# Patient Record
Sex: FEMALE | Race: WHITE
[De-identification: names, ages, dates, MRNs, and addresses within clinical notes are randomized per-mention and may not be internally consistent; named-entity substitution may affect disease eponyms.]

---

## 2017-08-31 ENCOUNTER — RX RENEWAL (OUTPATIENT)
Age: 32
End: 2017-08-31

## 2017-09-21 ENCOUNTER — RX RENEWAL (OUTPATIENT)
Age: 32
End: 2017-09-21

## 2017-09-26 ENCOUNTER — RX RENEWAL (OUTPATIENT)
Age: 32
End: 2017-09-26

## 2017-10-26 ENCOUNTER — RX RENEWAL (OUTPATIENT)
Age: 32
End: 2017-10-26

## 2017-11-03 ENCOUNTER — APPOINTMENT (OUTPATIENT)
Dept: OBGYN | Facility: CLINIC | Age: 32
End: 2017-11-03
Payer: COMMERCIAL

## 2017-11-03 VITALS
BODY MASS INDEX: 39.37 KG/M2 | DIASTOLIC BLOOD PRESSURE: 85 MMHG | HEIGHT: 66 IN | WEIGHT: 245 LBS | SYSTOLIC BLOOD PRESSURE: 141 MMHG

## 2017-11-03 DIAGNOSIS — Z01.419 ENCOUNTER FOR GYNECOLOGICAL EXAMINATION (GENERAL) (ROUTINE) W/OUT ABNORMAL FINDINGS: ICD-10-CM

## 2017-11-03 LAB
HCG UR QL: NEGATIVE
QUALITY CONTROL: YES

## 2017-11-03 PROCEDURE — 81025 URINE PREGNANCY TEST: CPT

## 2017-11-03 PROCEDURE — 99395 PREV VISIT EST AGE 18-39: CPT

## 2017-11-06 LAB
C TRACH RRNA SPEC QL NAA+PROBE: NOT DETECTED
HPV HIGH+LOW RISK DNA PNL CVX: NOT DETECTED
N GONORRHOEA RRNA SPEC QL NAA+PROBE: NOT DETECTED
SOURCE TP AMPLIFICATION: NORMAL

## 2017-11-08 LAB — CYTOLOGY CVX/VAG DOC THIN PREP: NORMAL

## 2018-12-10 ENCOUNTER — APPOINTMENT (OUTPATIENT)
Dept: OBGYN | Facility: CLINIC | Age: 33
End: 2018-12-10
Payer: COMMERCIAL

## 2018-12-10 VITALS
HEIGHT: 66 IN | DIASTOLIC BLOOD PRESSURE: 70 MMHG | BODY MASS INDEX: 35.2 KG/M2 | WEIGHT: 219 LBS | SYSTOLIC BLOOD PRESSURE: 120 MMHG

## 2018-12-10 DIAGNOSIS — Z87.898 PERSONAL HISTORY OF OTHER SPECIFIED CONDITIONS: ICD-10-CM

## 2018-12-10 LAB
HCG UR QL: NEGATIVE
QUALITY CONTROL: YES

## 2018-12-10 PROCEDURE — 99395 PREV VISIT EST AGE 18-39: CPT

## 2018-12-10 PROCEDURE — 81025 URINE PREGNANCY TEST: CPT

## 2018-12-13 LAB
C TRACH RRNA SPEC QL NAA+PROBE: NOT DETECTED
N GONORRHOEA RRNA SPEC QL NAA+PROBE: NOT DETECTED
SOURCE AMPLIFICATION: NORMAL

## 2019-05-20 ENCOUNTER — RX RENEWAL (OUTPATIENT)
Age: 34
End: 2019-05-20

## 2019-06-14 ENCOUNTER — APPOINTMENT (OUTPATIENT)
Dept: OBGYN | Facility: CLINIC | Age: 34
End: 2019-06-14
Payer: COMMERCIAL

## 2019-06-14 VITALS
HEIGHT: 66 IN | SYSTOLIC BLOOD PRESSURE: 126 MMHG | BODY MASS INDEX: 36.16 KG/M2 | DIASTOLIC BLOOD PRESSURE: 76 MMHG | WEIGHT: 225 LBS

## 2019-06-14 LAB
HCG UR QL: NEGATIVE
QUALITY CONTROL: YES

## 2019-06-14 PROCEDURE — 99213 OFFICE O/P EST LOW 20 MIN: CPT

## 2019-06-14 PROCEDURE — 81025 URINE PREGNANCY TEST: CPT

## 2019-06-14 NOTE — COUNSELING
[Nutrition] : nutrition [Breast Self Exam] : breast self exam [Exercise] : exercise [STD (testing, results, tx)] : STD (testing, results, tx) [Vitamins/Supplements] : vitamins/supplements [Contraception] : contraception [Weight Management] : weight management [FreeTextEntry2] : ACHES reviewed.  EAting better and is exercising, though has not lost weight, actually is 6 pounds up in 6 months

## 2019-11-26 ENCOUNTER — RX RENEWAL (OUTPATIENT)
Age: 34
End: 2019-11-26

## 2020-02-26 ENCOUNTER — RX RENEWAL (OUTPATIENT)
Age: 35
End: 2020-02-26

## 2020-03-21 ENCOUNTER — RX RENEWAL (OUTPATIENT)
Age: 35
End: 2020-03-21

## 2020-04-10 ENCOUNTER — APPOINTMENT (OUTPATIENT)
Dept: OBGYN | Facility: CLINIC | Age: 35
End: 2020-04-10

## 2020-06-25 ENCOUNTER — APPOINTMENT (OUTPATIENT)
Dept: OBGYN | Facility: CLINIC | Age: 35
End: 2020-06-25
Payer: COMMERCIAL

## 2020-06-25 VITALS
HEIGHT: 66 IN | WEIGHT: 216.38 LBS | SYSTOLIC BLOOD PRESSURE: 130 MMHG | DIASTOLIC BLOOD PRESSURE: 86 MMHG | BODY MASS INDEX: 34.78 KG/M2

## 2020-06-25 LAB
HCG UR QL: NEGATIVE
QUALITY CONTROL: YES

## 2020-06-25 PROCEDURE — 81025 URINE PREGNANCY TEST: CPT

## 2020-06-25 PROCEDURE — 99395 PREV VISIT EST AGE 18-39: CPT

## 2020-06-25 NOTE — PHYSICAL EXAM
[Alert] : alert [Awake] : awake [Soft] : soft [Oriented x3] : oriented to person, place, and time [Normal] : uterus [Nulliparous] : was nulliparous [No Bleeding] : there was no active vaginal bleeding [RRR, No Murmurs] : RRR, no murmurs [Normal Position] : in a normal position [Uterine Adnexae] : were not tender and not enlarged [CTAB] : CTAB [Acute Distress] : no acute distress [LAD] : no lymphadenopathy [Goiter] : no goiter [Thyroid Nodule] : no thyroid nodule [Mass] : no breast mass [Nipple Discharge] : no nipple discharge [Axillary LAD] : no axillary lymphadenopathy [Tender] : non tender [FreeTextEntry5] : supple though tight os

## 2020-06-25 NOTE — COUNSELING
[Breast Self Exam] : breast self exam [Exercise] : exercise [Nutrition] : nutrition [Contraception] : contraception [Vitamins/Supplements] : vitamins/supplements [Weight Management] : weight management [FreeTextEntry2] : encouraged to keep on healthy path with diet and exercise.  pt to start exercising now that knee is better.  spein 15 minutes discussing  importance of lweght loss, getting active, effect of weight on BPand menses, stress on joints etc.  Better to manage younger than as she gets o lder.  We also discussed Kyleena , benefits and pt will read lit

## 2020-06-25 NOTE — HISTORY OF PRESENT ILLNESS
[___ Year(s) Ago] : [unfilled] year(s) ago [Good] : being in good health [Healthy Diet] : a healthy diet [Weight Concerns] : weight concens [Regular Exercise] : regular exercise [___ Servings of Dairy/Day] : [unfilled] servings of dairy foods per day [3 or more times/wk] :  3 or more times per week [Biking] : biking [Recent Weight Loss (___ Lbs)] : recent [unfilled] ~Ulbs weight loss [Current] : metabolic screening reviewed and current [Last Pap ___] : Last cervical pap smear was [unfilled] [Performed Within 5 Years] : lipid profile performed within the past five years [Hypertension] : hypertension [Obesity] : obesity [Reproductive Age] : is of reproductive age [Definite ___ (Date)] : the last menstrual period was [unfilled] [Regular Cycle Intervals] : have been regular [Normal Amount/Duration] : it was of a normal amount and duration [Bleeding Usually Lasts ___ Days] : usually last [unfilled] days [Oral Contraceptive] : uses oral contraception pills [Contraception] : uses contraception [Condoms] : uses condoms [Pregnancy History] : pregnancy history: [AB Induced ___] : elective abortions: [unfilled] [Total Preg ___] : [unfilled] [Sexually Active] : is sexually active [Monogamous (Male Partner)] : is monogamous with a male partner [Safe Sex] : uses safe sex precautions [HPV Vaccine ___] : HPV vaccine [unfilled] [Last Screen ___] : last STD screen was [unfilled] [Walking] : walking [Performed Last Year] : thyroid function test performed last year [Lighter Bleeding] : bleeding has been lighter than normal [Diabetes] : no diabetes [High LDL] : no high LDL cholesterol [Stress] : no stress [Low HDL] : no low HDL cholesterol [Tobacco Use] : no tobacco use [Illicit Drug Use] : no illicit drug use [Sedentary Lifestyle] : no sedentary lifestyle [Previous Breast Cancer] : no previous breast cancer [Abnormal Cervical Cytology] : no abnormal cervical cytology [FH Cardiovascular Disease] : no family history of cardiovascular disease [Inflammatory Bowel Disease] : no inflammatory bowel disease [HPV Positive] : no positive screening for human papilloma virus [Menstrual Problems] : reports normal menses [Chlamydia] : Chlamydia - [Gonorrhea] : Gonorrhea -

## 2021-03-22 ENCOUNTER — TRANSCRIPTION ENCOUNTER (OUTPATIENT)
Age: 36
End: 2021-03-22

## 2021-03-25 ENCOUNTER — TRANSCRIPTION ENCOUNTER (OUTPATIENT)
Age: 36
End: 2021-03-25

## 2021-03-31 ENCOUNTER — TRANSCRIPTION ENCOUNTER (OUTPATIENT)
Age: 36
End: 2021-03-31

## 2021-08-22 ENCOUNTER — TRANSCRIPTION ENCOUNTER (OUTPATIENT)
Age: 36
End: 2021-08-22

## 2021-08-26 ENCOUNTER — RX RENEWAL (OUTPATIENT)
Age: 36
End: 2021-08-26

## 2021-08-29 ENCOUNTER — NON-APPOINTMENT (OUTPATIENT)
Age: 36
End: 2021-08-29

## 2021-09-25 ENCOUNTER — TRANSCRIPTION ENCOUNTER (OUTPATIENT)
Age: 36
End: 2021-09-25

## 2021-11-22 ENCOUNTER — RX RENEWAL (OUTPATIENT)
Age: 36
End: 2021-11-22

## 2021-12-10 ENCOUNTER — APPOINTMENT (OUTPATIENT)
Dept: OBGYN | Facility: CLINIC | Age: 36
End: 2021-12-10
Payer: COMMERCIAL

## 2021-12-10 ENCOUNTER — RESULT CHARGE (OUTPATIENT)
Age: 36
End: 2021-12-10

## 2021-12-10 VITALS
WEIGHT: 211 LBS | SYSTOLIC BLOOD PRESSURE: 108 MMHG | HEIGHT: 66 IN | DIASTOLIC BLOOD PRESSURE: 80 MMHG | BODY MASS INDEX: 33.91 KG/M2

## 2021-12-10 DIAGNOSIS — Z30.40 ENCOUNTER FOR SURVEILLANCE OF CONTRACEPTIVES, UNSPECIFIED: ICD-10-CM

## 2021-12-10 DIAGNOSIS — Z30.41 ENCOUNTER FOR SURVEILLANCE OF CONTRACEPTIVE PILLS: ICD-10-CM

## 2021-12-10 LAB
HCG UR QL: NEGATIVE
QUALITY CONTROL: YES

## 2021-12-10 PROCEDURE — 99395 PREV VISIT EST AGE 18-39: CPT

## 2021-12-10 PROCEDURE — 36415 COLL VENOUS BLD VENIPUNCTURE: CPT

## 2021-12-10 PROCEDURE — 81025 URINE PREGNANCY TEST: CPT

## 2021-12-10 NOTE — PLAN
[FreeTextEntry1] : 35 YO FEMALE,HERE FOR ANNUAL EXAM\par PHYSICAL EXAM\par NORMAL\par BREAST EXAM NORMAL\par - PAP\par - STD \par -URINE PREG TEST NEG\par Contraceptive education given. Reviewed side effect, risk and benefits. Safe sex and lifestyle counseling. advice . advice continue as directed, reviewed and discussed adherence and compliance. Rsks and benefits of OC's including stroke, HTN, MI, phlebitis, breast cancer and pregnancy prevention all d/w patient. \par WEIGHT MANAGEMENT Discussed WITH PATIENT\par ALL QUESTIONS ANSWERED\par DISCUSSED PRECAUTIONS AGAINST COVID19, INCLUDING MASK WEARING, SOCIAL DISTANCING AND HAND WASHING.\par VACCINATED X 2. (PFIZER) \par THE PHYSICAL EXAM PORTION OF THE VISIT WAS CHAPERONED BY VENESSA LOPEZ.

## 2021-12-10 NOTE — COUNSELING
[Nutrition/ Exercise/ Weight Management] : nutrition, exercise, weight management [Sunscreen] : sunscreen [Breast Self Exam] : breast self exam [Bladder Hygiene] : bladder hygiene [STD (testing, results, tx)] : STD (testing, results, tx) [Vaccines] : vaccines

## 2021-12-10 NOTE — HISTORY OF PRESENT ILLNESS
[Regular Cycle Intervals] : periods have been regular [Menarche Age: ____] : age at menarche was [unfilled] [On BCP at conception] : the patient was on BCP at conception [Currently Active] : currently active [Men] : men [No] : No [Yes] : Yes [Condoms] : Condoms [Patient would like to be screened for STIs] : Patient would like to be screened for STIs [PapSmeardate] : 2017 [FreeTextEntry1] : 11/24/21

## 2021-12-10 NOTE — PHYSICAL EXAM
[Chaperone Present] : A chaperone was present in the examining room during all aspects of the physical examination [FreeTextEntry1] : VENESSA LOPEZ.  [Appropriately responsive] : appropriately responsive [Alert] : alert [No Acute Distress] : no acute distress [No Lymphadenopathy] : no lymphadenopathy [Regular Rate Rhythm] : regular rate rhythm [No Murmurs] : no murmurs [Clear to Auscultation B/L] : clear to auscultation bilaterally [Soft] : soft [Non-tender] : non-tender [Non-distended] : non-distended [No HSM] : No HSM [No Lesions] : no lesions [No Mass] : no mass [Oriented x3] : oriented x3 [Examination Of The Breasts] : a normal appearance [No Masses] : no breast masses were palpable [Labia Majora] : normal [Labia Minora] : normal [Normal] : normal [Uterine Adnexae] : normal

## 2021-12-13 LAB
C TRACH RRNA SPEC QL NAA+PROBE: NOT DETECTED
HBV SURFACE AB SER QL: NONREACTIVE
HBV SURFACE AG SER QL: NONREACTIVE
HCV AB SER QL: NONREACTIVE
HCV S/CO RATIO: 0.07 S/CO
HIV1+2 AB SPEC QL IA.RAPID: NONREACTIVE
HPV HIGH+LOW RISK DNA PNL CVX: NOT DETECTED
N GONORRHOEA RRNA SPEC QL NAA+PROBE: NOT DETECTED
SOURCE TP AMPLIFICATION: NORMAL
T PALLIDUM AB SER QL IA: NEGATIVE

## 2021-12-16 LAB — CYTOLOGY CVX/VAG DOC THIN PREP: NORMAL

## 2022-07-26 ENCOUNTER — NON-APPOINTMENT (OUTPATIENT)
Age: 37
End: 2022-07-26

## 2023-02-06 ENCOUNTER — APPOINTMENT (OUTPATIENT)
Dept: OBGYN | Facility: CLINIC | Age: 38
End: 2023-02-06
Payer: COMMERCIAL

## 2023-02-06 ENCOUNTER — NON-APPOINTMENT (OUTPATIENT)
Age: 38
End: 2023-02-06

## 2023-02-06 VITALS
BODY MASS INDEX: 34.55 KG/M2 | HEIGHT: 66 IN | DIASTOLIC BLOOD PRESSURE: 70 MMHG | SYSTOLIC BLOOD PRESSURE: 122 MMHG | WEIGHT: 215 LBS

## 2023-02-06 LAB
HCG UR QL: NEGATIVE
QUALITY CONTROL: YES

## 2023-02-06 PROCEDURE — 81025 URINE PREGNANCY TEST: CPT

## 2023-02-06 PROCEDURE — 99395 PREV VISIT EST AGE 18-39: CPT

## 2023-02-06 PROCEDURE — 36415 COLL VENOUS BLD VENIPUNCTURE: CPT

## 2023-02-06 RX ORDER — MULTIVITAMIN
TABLET ORAL
Refills: 0 | Status: ACTIVE | COMMUNITY

## 2023-02-06 NOTE — PLAN
[FreeTextEntry1] : -F/u pap and STI screening \par -Rx for HERNANDO renewed, reviewed signs and symptoms of potentially serious adverse events including CHP, SOB, leg pain/swelling/redness, HA's and sensory changes and the need for emergent evaluation in case any of these should occur \par -Encouraged dermatology fro routine skin survey \par -RTO one year or sooner PRN for any new problems, questions or concerns

## 2023-02-06 NOTE — PHYSICAL EXAM
[Chaperone Present] : A chaperone was present in the examining room during all aspects of the physical examination [FreeTextEntry1] : JOHN Erickson  [Appropriately responsive] : appropriately responsive [Alert] : alert [No Acute Distress] : no acute distress [Soft] : soft [Non-tender] : non-tender [Non-distended] : non-distended [No Mass] : no mass [Oriented x3] : oriented x3 [Examination Of The Breasts] : a normal appearance [No Masses] : no breast masses were palpable [Labia Majora] : normal [Labia Minora] : normal [No Bleeding] : There was no active vaginal bleeding [Nabothian Cyst ___ cm] : [unfilled] ~Ucm nabothian cyst [Normal] : normal [Tenderness] : nontender [Uterine Adnexae] : non-palpable

## 2023-02-06 NOTE — HISTORY OF PRESENT ILLNESS
[N] : Patient reports normal menses [Oral Contraceptive] : uses oral contraception pills [Y] : Positive pregnancy history [Menarche Age: ____] : age at menarche was [unfilled] [Currently Active] : currently active [PapSmeardate] : 12/10/21 [TextBox_31] : NEG [HIVDate] : 12/10/21 [TextBox_53] : NEG [SyphilisDate] : 12/10/21 [TextBox_58] : NEG [GonorrheaDate] : 12/10/21 [TextBox_63] : NEG [ChlamydiaDate] : 12/10/21 [TextBox_68] : NEG [HPVDate] : 12/10/21 [TextBox_78] : NEG [HepatitisBDate] : 12/10/21 [TextBox_83] : NEG [HepatitisCDate] : 12/10/21 [TextBox_88] : NEG [LMPDate] : 01/27/23 [PGxTotal] : 1 [PGHxAbortions] : 1 [[facility]Living] : 0 [FreeTextEntry1] : 01/27/23

## 2023-02-09 LAB
C TRACH RRNA SPEC QL NAA+PROBE: NOT DETECTED
HAV IGM SER QL: NONREACTIVE
HBV CORE IGM SER QL: NONREACTIVE
HBV SURFACE AG SER QL: NONREACTIVE
HCV AB SER QL: NONREACTIVE
HCV S/CO RATIO: 0.04 S/CO
HIV1+2 AB SPEC QL IA.RAPID: NONREACTIVE
HPV HIGH+LOW RISK DNA PNL CVX: NOT DETECTED
N GONORRHOEA RRNA SPEC QL NAA+PROBE: NOT DETECTED
SOURCE TP AMPLIFICATION: NORMAL
T PALLIDUM AB SER QL IA: NEGATIVE

## 2023-02-10 LAB — CYTOLOGY CVX/VAG DOC THIN PREP: ABNORMAL

## 2024-02-02 ENCOUNTER — APPOINTMENT (OUTPATIENT)
Dept: OBGYN | Facility: CLINIC | Age: 39
End: 2024-02-02
Payer: COMMERCIAL

## 2024-02-02 VITALS
SYSTOLIC BLOOD PRESSURE: 118 MMHG | HEIGHT: 66 IN | BODY MASS INDEX: 33.59 KG/M2 | DIASTOLIC BLOOD PRESSURE: 74 MMHG | WEIGHT: 209 LBS

## 2024-02-02 PROCEDURE — 99213 OFFICE O/P EST LOW 20 MIN: CPT

## 2024-02-04 NOTE — DISCUSSION/SUMMARY
[FreeTextEntry1] : 37 y/o with small clogged follicle on labia minora, no signs of infection - Counseled on findings, no management needed - Return as needed.  Boris Medina MD

## 2024-02-04 NOTE — PHYSICAL EXAM
[Chaperone Present] : A chaperone was present in the examining room during all aspects of the physical examination [Appropriately responsive] : appropriately responsive [Alert] : alert [No Acute Distress] : no acute distress [Oriented x3] : oriented x3 [Labia Majora] : normal [Labia Minora] : normal [Normal] : normal [FreeTextEntry2] : Sub centimeter clogged follicle on left labia minora, no erythema, swelling, nor drainage.

## 2024-03-22 ENCOUNTER — APPOINTMENT (OUTPATIENT)
Dept: OBGYN | Facility: CLINIC | Age: 39
End: 2024-03-22
Payer: COMMERCIAL

## 2024-03-22 VITALS
WEIGHT: 201 LBS | HEIGHT: 66 IN | BODY MASS INDEX: 32.3 KG/M2 | SYSTOLIC BLOOD PRESSURE: 122 MMHG | DIASTOLIC BLOOD PRESSURE: 80 MMHG

## 2024-03-22 DIAGNOSIS — N94.9 UNSPECIFIED CONDITION ASSOCIATED WITH FEMALE GENITAL ORGANS AND MENSTRUAL CYCLE: ICD-10-CM

## 2024-03-22 DIAGNOSIS — Z86.79 PERSONAL HISTORY OF OTHER DISEASES OF THE CIRCULATORY SYSTEM: ICD-10-CM

## 2024-03-22 DIAGNOSIS — Z30.9 ENCOUNTER FOR CONTRACEPTIVE MANAGEMENT, UNSPECIFIED: ICD-10-CM

## 2024-03-22 DIAGNOSIS — Z01.419 ENCOUNTER FOR GYNECOLOGICAL EXAMINATION (GENERAL) (ROUTINE) W/OUT ABNORMAL FINDINGS: ICD-10-CM

## 2024-03-22 DIAGNOSIS — Z11.3 ENCOUNTER FOR SCREENING FOR INFECTIONS WITH A PREDOMINANTLY SEXUAL MODE OF TRANSMISSION: ICD-10-CM

## 2024-03-22 DIAGNOSIS — Z12.4 ENCOUNTER FOR SCREENING FOR MALIGNANT NEOPLASM OF CERVIX: ICD-10-CM

## 2024-03-22 PROCEDURE — 99395 PREV VISIT EST AGE 18-39: CPT

## 2024-03-22 RX ORDER — NORGESTIMATE AND ETHINYL ESTRADIOL 7DAYSX3 28
0.18/0.215/0.25 KIT ORAL
Qty: 3 | Refills: 3 | Status: ACTIVE | COMMUNITY
Start: 2019-05-20 | End: 1900-01-01

## 2024-06-05 NOTE — PLAN
[FreeTextEntry1] : Cervical screening:  Pap smear not completed as per ASCCP guideline. Pt does not have history of abnormal pap smears. Will repeat in 3 years.   Health Maintenance:  Discussed with pt the importance of monthly self-breast exams.  Encouraged pt to continue exercising.  Pt declines Gardasil vaccine.   Pt will return in a year for an annual or sooner as needed.   All questions and concerns addressed.

## 2024-06-05 NOTE — PHYSICAL EXAM
[Chaperone Present] : A chaperone was present in the examining room during all aspects of the physical examination [Appropriately responsive] : appropriately responsive [Soft] : soft [Alert] : alert [Non-tender] : non-tender [Non-distended] : non-distended [Examination Of The Breasts] : a normal appearance [Oriented x3] : oriented x3 [No Masses] : no breast masses were palpable [Labia Majora] : normal [Labia Minora] : normal [Normal] : normal [Uterine Adnexae] : normal [Normal Position] : in a normal position [FreeTextEntry1] : Np student

## 2024-06-05 NOTE — HISTORY OF PRESENT ILLNESS
[N] : Patient reports normal menses [Oral Contraceptive] : uses oral contraception pills [Y] : Positive pregnancy history [No] : Patient does not have concerns regarding sex [Previously active] : previously active [PapSmeardate] : 02/06/2023 [TextBox_31] : NEG [HIVDate] : 02/06/2023 [TextBox_53] : NEG [SyphilisDate] : 02/06/2023 [TextBox_58] : NEG [GonorrheaDate] : 02/06/2023 [TextBox_63] : NEG [ChlamydiaDate] : 02/06/2023 [TextBox_68] : NEG [HPVDate] : 02/06/2023 [TextBox_78] : NEG [HepatitisBDate] : 12/1020/21 [TextBox_83] : NEG [HepatitisCDate] : 12/10/2021 [TextBox_88] : NEG [LMPDate] : 03/01/24 [PGxTotal] : 1 [PGHxAbortions] : 1 [FreeTextEntry1] : 03/01/24

## 2025-01-18 ENCOUNTER — RX RENEWAL (OUTPATIENT)
Age: 40
End: 2025-01-18

## 2025-01-20 ENCOUNTER — NON-APPOINTMENT (OUTPATIENT)
Age: 40
End: 2025-01-20

## 2025-01-21 ENCOUNTER — NON-APPOINTMENT (OUTPATIENT)
Age: 40
End: 2025-01-21

## 2025-03-24 ENCOUNTER — APPOINTMENT (OUTPATIENT)
Dept: OBGYN | Facility: CLINIC | Age: 40
End: 2025-03-24

## 2025-03-24 ENCOUNTER — LABORATORY RESULT (OUTPATIENT)
Age: 40
End: 2025-03-24

## 2025-03-24 VITALS
BODY MASS INDEX: 26.07 KG/M2 | DIASTOLIC BLOOD PRESSURE: 70 MMHG | SYSTOLIC BLOOD PRESSURE: 110 MMHG | WEIGHT: 162.2 LBS | HEIGHT: 66 IN

## 2025-03-24 DIAGNOSIS — Z12.4 ENCOUNTER FOR SCREENING FOR MALIGNANT NEOPLASM OF CERVIX: ICD-10-CM

## 2025-03-24 DIAGNOSIS — R35.0 FREQUENCY OF MICTURITION: ICD-10-CM

## 2025-03-24 DIAGNOSIS — Z01.419 ENCOUNTER FOR GYNECOLOGICAL EXAMINATION (GENERAL) (ROUTINE) W/OUT ABNORMAL FINDINGS: ICD-10-CM

## 2025-03-24 DIAGNOSIS — Z11.3 ENCOUNTER FOR SCREENING FOR INFECTIONS WITH A PREDOMINANTLY SEXUAL MODE OF TRANSMISSION: ICD-10-CM

## 2025-03-24 DIAGNOSIS — Z30.9 ENCOUNTER FOR CONTRACEPTIVE MANAGEMENT, UNSPECIFIED: ICD-10-CM

## 2025-03-24 LAB
HCG UR QL: NEGATIVE
QUALITY CONTROL: YES

## 2025-03-24 PROCEDURE — 36415 COLL VENOUS BLD VENIPUNCTURE: CPT

## 2025-03-24 PROCEDURE — 99395 PREV VISIT EST AGE 18-39: CPT

## 2025-03-24 PROCEDURE — 81025 URINE PREGNANCY TEST: CPT

## 2025-03-24 PROCEDURE — 99459 PELVIC EXAMINATION: CPT

## 2025-03-27 LAB
BACTERIA UR CULT: NORMAL
HAV IGM SER QL: NONREACTIVE
HBV CORE IGM SER QL: NONREACTIVE
HBV SURFACE AG SER QL: NONREACTIVE
HCV AB SER QL: NONREACTIVE
HCV S/CO RATIO: 0.11 S/CO
HIV1+2 AB SPEC QL IA.RAPID: NONREACTIVE
HPV HIGH+LOW RISK DNA PNL CVX: NOT DETECTED
T PALLIDUM AB SER QL IA: NEGATIVE

## 2025-04-01 LAB — CYTOLOGY CVX/VAG DOC THIN PREP: NORMAL
